# Patient Record
Sex: MALE | Race: AMERICAN INDIAN OR ALASKA NATIVE
[De-identification: names, ages, dates, MRNs, and addresses within clinical notes are randomized per-mention and may not be internally consistent; named-entity substitution may affect disease eponyms.]

---

## 2018-09-14 ENCOUNTER — HOSPITAL ENCOUNTER (EMERGENCY)
Dept: HOSPITAL 5 - ED | Age: 38
Discharge: HOME | End: 2018-09-14
Payer: COMMERCIAL

## 2018-09-14 VITALS — SYSTOLIC BLOOD PRESSURE: 160 MMHG | DIASTOLIC BLOOD PRESSURE: 102 MMHG

## 2018-09-14 DIAGNOSIS — F17.200: ICD-10-CM

## 2018-09-14 DIAGNOSIS — I10: Primary | ICD-10-CM

## 2018-09-14 DIAGNOSIS — R20.2: ICD-10-CM

## 2018-09-14 LAB
BASOPHILS # (AUTO): 0 K/MM3 (ref 0–0.1)
BASOPHILS NFR BLD AUTO: 0.3 % (ref 0–1.8)
BILIRUB UR QL STRIP: (no result)
BLOOD UR QL VISUAL: (no result)
BUN SERPL-MCNC: 8 MG/DL (ref 9–20)
BUN/CREAT SERPL: 7 %
CALCIUM SERPL-MCNC: 9.8 MG/DL (ref 8.4–10.2)
EOSINOPHIL # BLD AUTO: 0.3 K/MM3 (ref 0–0.4)
EOSINOPHIL NFR BLD AUTO: 5 % (ref 0–4.3)
HCT VFR BLD CALC: 47.3 % (ref 35.5–45.6)
HEMOLYSIS INDEX: 11
HGB BLD-MCNC: 16.2 GM/DL (ref 11.8–15.2)
LYMPHOCYTES # BLD AUTO: 1.4 K/MM3 (ref 1.2–5.4)
LYMPHOCYTES NFR BLD AUTO: 20.6 % (ref 13.4–35)
MCH RBC QN AUTO: 31 PG (ref 28–32)
MCHC RBC AUTO-ENTMCNC: 34 % (ref 32–34)
MCV RBC AUTO: 90 FL (ref 84–94)
MONOCYTES # (AUTO): 0.7 K/MM3 (ref 0–0.8)
MONOCYTES % (AUTO): 10.3 % (ref 0–7.3)
PH UR STRIP: 7 [PH] (ref 5–7)
PLATELET # BLD: 168 K/MM3 (ref 140–440)
PROT UR STRIP-MCNC: (no result) MG/DL
RBC # BLD AUTO: 5.26 M/MM3 (ref 3.65–5.03)
RBC #/AREA URNS HPF: 3 /HPF (ref 0–6)
UROBILINOGEN UR-MCNC: < 2 MG/DL (ref ?–2)
WBC #/AREA URNS HPF: < 1 /HPF (ref 0–6)

## 2018-09-14 PROCEDURE — 70450 CT HEAD/BRAIN W/O DYE: CPT

## 2018-09-14 PROCEDURE — 93010 ELECTROCARDIOGRAM REPORT: CPT

## 2018-09-14 PROCEDURE — 85025 COMPLETE CBC W/AUTO DIFF WBC: CPT

## 2018-09-14 PROCEDURE — 93005 ELECTROCARDIOGRAM TRACING: CPT

## 2018-09-14 PROCEDURE — 99284 EMERGENCY DEPT VISIT MOD MDM: CPT

## 2018-09-14 PROCEDURE — 81001 URINALYSIS AUTO W/SCOPE: CPT

## 2018-09-14 PROCEDURE — 36415 COLL VENOUS BLD VENIPUNCTURE: CPT

## 2018-09-14 PROCEDURE — 80048 BASIC METABOLIC PNL TOTAL CA: CPT

## 2018-09-14 NOTE — CAT SCAN REPORT
FINAL REPORT



EXAM:  CT HEAD/BRAIN WO CON



HISTORY:  headache, numb l foot 



TECHNIQUE:  Routine axial imaging was obtained of the brain

without IV contrast.



FINDINGS:  

The ventricular system is appropriate in size and is symmetric.

There is no evidence of acute stroke or hemorrhage. The

visualized sinuses are clear. The mastoid air cells are well

pneumatized. The calvarium appears intact.



IMPRESSION:  

No acute intracranial process.

## 2018-09-14 NOTE — EMERGENCY DEPARTMENT REPORT
ED General Adult HPI





- General


Chief complaint: Dizziness


Stated complaint: DIZZINESS,HEADACHE,NUMB FOOT


Time Seen by Provider: 09/14/18 06:12


Source: patient


Mode of arrival: Ambulatory


Limitations: No Limitations





- History of Present Illness


Initial comments: 


This is a 37-year-old male who states he's had intermittent numbness of his 

foot and sometimes of his left face for the last 1 month.  He states he was 

seen at [place] [facility] urgent care 2 weeks ago for the same symptoms.  

Yesterday he had a "slight headache" which has resolved.  He decided To the 

emergency room to be evaluated.  He does not complain of any persistent 

symptoms.  His face is not numb.  His foot is not numb.  He states he felt 

slightly dizzy at work.  He is not taking any current medications.  He denies a 

history of diabetes.  


-: month(s)


Location: face, left, lower extremity


Radiation: non-radiation


Severity scale (0 -10): 0


Quality: other (tingling)


Consistency: intermittent, now resolved


Improves with: none


Worsens with: none


Associated Symptoms: denies other symptoms, headaches (as above)


Treatments Prior to Arrival: none





- Related Data


 Previous Rx's











 Medication  Instructions  Recorded  Last Taken  Type


 


Amlodipine Besylate [Norvasc] 2.5 mg PO QDAY #30 tablet 09/14/18 Unknown Rx











 Allergies











Allergy/AdvReac Type Severity Reaction Status Date / Time


 


No Known Allergies Allergy   Unverified 09/14/18 01:19














ED Review of Systems


ROS: 


Stated complaint: DIZZINESS,HEADACHE,NUMB FOOT


Other details as noted in HPI





Constitutional: denies: chills, fever


Eyes: denies: eye pain, eye discharge, vision change


ENT: denies: ear pain, throat pain


Respiratory: denies: cough, shortness of breath, wheezing


Cardiovascular: denies: chest pain, palpitations


Endocrine: no symptoms reported


Gastrointestinal: denies: abdominal pain, nausea, diarrhea


Genitourinary: denies: urgency, dysuria


Musculoskeletal: denies: back pain, joint swelling, arthralgia


Skin: denies: rash, lesions


Neurological: as per HPI, headache, paresthesias.  denies: weakness, numbness, 

confusion, abnormal gait, vertigo


Psychiatric: denies: anxiety, depression


Hematological/Lymphatic: denies: easy bleeding, easy bruising





ED Past Medical Hx





- Past Medical History


Previous Medical History?: No





- Surgical History


Past Surgical History?: No





- Social History


Smoking Status: Current Every Day Smoker


Substance Use Type: None





- Medications


Home Medications: 


 Home Medications











 Medication  Instructions  Recorded  Confirmed  Last Taken  Type


 


Amlodipine Besylate [Norvasc] 2.5 mg PO QDAY #30 tablet 09/14/18  Unknown Rx














ED Physical Exam





- General


Limitations: No Limitations


General appearance: alert, in no apparent distress





- Head


Head exam: Present: atraumatic, normocephalic





- Eye


Eye exam: Present: normal appearance, PERRL, EOMI.  Absent: scleral icterus





- ENT


ENT exam: Present: mucous membranes moist





- Neck


Neck exam: Present: normal inspection.  Absent: tenderness, meningismus





- Respiratory


Respiratory exam: Present: normal lung sounds bilaterally.  Absent: respiratory 

distress





- Cardiovascular


Cardiovascular Exam: Present: regular rate, normal rhythm.  Absent: systolic 

murmur, diastolic murmur, rubs, gallop





- GI/Abdominal


GI/Abdominal exam: Present: soft, normal bowel sounds.  Absent: distended, 

tenderness, guarding, rebound, rigid





- Rectal


Rectal exam: Present: deferred





- Extremities Exam


Extremities exam: Present: normal inspection





- Back Exam


Back exam: Present: normal inspection.  Absent: CVA tenderness (R), CVA 

tenderness (L), muscle spasm, paraspinal tenderness, vertebral tenderness





- Neurological Exam


Neurological exam: Present: alert, oriented X3, CN II-XII intact, normal gait, 

other (cerebellar testing was normal).  Absent: motor sensory deficit





- Psychiatric


Psychiatric exam: Present: normal affect, normal mood





- Skin


Skin exam: Present: warm, dry, intact, normal color.  Absent: rash





ED Course


 Vital Signs











  09/14/18 09/14/18 09/14/18





  01:23 04:27 05:41


 


Temperature 97.8 F 97.7 F 


 


Pulse Rate 71 57 L 74


 


Respiratory 14 14 18





Rate   


 


Blood Pressure 149/101 156/98 


 


Blood Pressure   160/102





[Right]   


 


O2 Sat by Pulse 98 98 98





Oximetry   














- Reevaluation(s)


Reevaluation #1: 


Patient was found resting comfortably and totally asymptomatic.  He has no 

complaints at the time my encounter.


09/14/18 07:07








ED Medical Decision Making





- Lab Data


Result diagrams: 


 09/14/18 01:41





 09/14/18 01:41








 Laboratory Results - last 24 hr











  09/14/18 09/14/18





  01:41 01:41


 


WBC  6.7 


 


RBC  5.26 H 


 


Hgb  16.2 H 


 


Hct  47.3 H 


 


MCV  90 


 


MCH  31 


 


MCHC  34 


 


RDW  12.2 L 


 


Plt Count  168 


 


Lymph % (Auto)  20.6 


 


Mono % (Auto)  10.3 H 


 


Eos % (Auto)  5.0 H 


 


Baso % (Auto)  0.3 


 


Lymph #  1.4 


 


Mono #  0.7 


 


Eos #  0.3 


 


Baso #  0.0 


 


Seg Neutrophils %  63.8 


 


Seg Neutrophils #  4.3 


 


Sodium   139


 


Potassium   4.4


 


Chloride   95.7 L


 


Carbon Dioxide   31 H


 


Anion Gap   17


 


BUN   8 L


 


Creatinine   1.2


 


Estimated GFR   > 60


 


BUN/Creatinine Ratio   7


 


Glucose   86


 


Calcium   9.8














- EKG Data


EKG shows normal: sinus rhythm, axis (leftward), intervals, QRS complexes, ST-T 

waves


Rate: normal





- EKG Data


Interpretation: no acute changes





- Medical Decision Making


Patient is found to have 3 elevated blood pressure readings.  He be placed on 

Norvasc.  He will be instructed to follow-up with a primary care physician.  I 

do not think the hypertension the paresthesias are related.





Critical care attestation.: 


If time is entered above; I have spent that time in minutes in the direct care 

of this critically ill patient, excluding procedure time.








ED Disposition


Clinical Impression: 


 Essential hypertension, Paresthesia





Disposition: DC-01 TO HOME OR SELFCARE


Is pt being admited?: No


Does the pt Need Aspirin: No


Condition: Stable


Instructions:  Hypertension (ED), Paresthesia (ED)


Additional Instructions: 


Follow-up with her primary care provider.  Return any acute change or worsening 

symptoms.  It appears to do have high blood pressure.  I have gone U on some 

medicine for this.


Prescriptions: 


Amlodipine Besylate [Norvasc] 2.5 mg PO QDAY #30 tablet


Referrals: 


PRIMARY CARE,MD [Primary Care Provider] - 2-3 Days


Time of Disposition: 07:10

## 2018-09-18 ENCOUNTER — HOSPITAL ENCOUNTER (EMERGENCY)
Dept: HOSPITAL 5 - ED | Age: 38
LOS: 1 days | Discharge: HOME | End: 2018-09-19
Payer: COMMERCIAL

## 2018-09-18 DIAGNOSIS — R51: ICD-10-CM

## 2018-09-18 DIAGNOSIS — I10: ICD-10-CM

## 2018-09-18 DIAGNOSIS — F17.200: ICD-10-CM

## 2018-09-18 DIAGNOSIS — F41.9: Primary | ICD-10-CM

## 2018-09-18 LAB
HCT VFR BLD CALC: 44.7 % (ref 35.5–45.6)
HGB BLD-MCNC: 16.1 GM/DL (ref 11.8–15.2)
MCH RBC QN AUTO: 32 PG (ref 28–32)
MCHC RBC AUTO-ENTMCNC: 36 % (ref 32–34)
MCV RBC AUTO: 89 FL (ref 84–94)
PLATELET # BLD: 163 K/MM3 (ref 140–440)
RBC # BLD AUTO: 5.05 M/MM3 (ref 3.65–5.03)

## 2018-09-18 PROCEDURE — 93010 ELECTROCARDIOGRAM REPORT: CPT

## 2018-09-18 PROCEDURE — 36415 COLL VENOUS BLD VENIPUNCTURE: CPT

## 2018-09-18 PROCEDURE — 80048 BASIC METABOLIC PNL TOTAL CA: CPT

## 2018-09-18 PROCEDURE — 93005 ELECTROCARDIOGRAM TRACING: CPT

## 2018-09-18 PROCEDURE — 71046 X-RAY EXAM CHEST 2 VIEWS: CPT

## 2018-09-18 PROCEDURE — 84484 ASSAY OF TROPONIN QUANT: CPT

## 2018-09-18 PROCEDURE — 99284 EMERGENCY DEPT VISIT MOD MDM: CPT

## 2018-09-18 PROCEDURE — 85025 COMPLETE CBC W/AUTO DIFF WBC: CPT

## 2018-09-18 PROCEDURE — 85007 BL SMEAR W/DIFF WBC COUNT: CPT

## 2018-09-18 NOTE — EMERGENCY DEPARTMENT REPORT
HPI





- General


Chief Complaint: Anxiety


Time Seen by Provider: 09/18/18 23:12





- HPI


HPI: 


37-year-old  male presents to the emergency department via EMS 

from home with the complaint of some left-sided chest pain and a headache that 

started just prior to presentation while he was having an argument with his 

daughter.  The chest pain resolved upon presentation.  The headache is a mild 

intermittent left-sided throbbing headache that is 3 out of 10 in intensity at 

its worst.  He denies any vision change, slurred speech, back pain, shortness 

of breath, fever, nausea, vomiting or any neurological deficits.  He has a past 

medical history of hypertension for which he takes 2.5 mg of amlodipine daily.  

He is a tobacco smoker but denies any illicit drug use or heavy alcohol abuse.  

No recent travel, sick contacts at home, recent surgeries or immobility.  He 

did not take anything for his symptoms prior presentation.  He does not have a 

primary care physician.








ED Past Medical Hx





- Past Medical History


Hx Hypertension: Yes





- Surgical History


Past Surgical History?: No





- Social History


Smoking Status: Current Every Day Smoker


Substance Use Type: None





- Medications


Home Medications: 


 Home Medications











 Medication  Instructions  Recorded  Confirmed  Last Taken  Type


 


Amlodipine Besylate [Norvasc] 2.5 mg PO QDAY #30 tablet 09/14/18  Unknown Rx














ED Review of Systems


ROS: 


Stated complaint: HA/CP


Other details as noted in HPI





Comment: All other systems reviewed and negative


Constitutional: denies: chills, fever


Eyes: denies: eye pain, eye discharge, vision change


ENT: denies: ear pain, throat pain


Respiratory: denies: cough, shortness of breath, wheezing


Cardiovascular: chest pain (resolved).  denies: edema


Gastrointestinal: denies: abdominal pain, nausea, diarrhea


Genitourinary: denies: urgency, dysuria


Musculoskeletal: denies: back pain, joint swelling, arthralgia


Skin: denies: rash, lesions


Neurological: headache.  denies: weakness, numbness





Physical Exam





- Physical Exam


Vital Signs: 


 Vital Signs











  09/18/18 09/18/18





  23:12 23:15


 


Temperature 98.8 F 98.7 F


 


Pulse Rate 80 88


 


Respiratory 16 





Rate  


 


Blood Pressure  159/91


 


Blood Pressure 159/90 





[Left]  


 


O2 Sat by Pulse  100





Oximetry  











Physical Exam: 





GENERAL: The patient is well-developed well-nourished.


HENT: Normocephalic.  Atraumatic.    Patient has moist mucous membranes.


EYES: Extraocular motions are intact.  Pupils equal reactive to light 

bilaterally.


NECK: Supple.  Trachea is midline.


CHEST/LUNGS: Clear to auscultation.  There is no respiratory distress noted.


HEART/CARDIOVASCULAR: Regular.  There is no tachycardia.  There is no murmur.


ABDOMEN: Abdomen is soft, nontender.  Patient has normal bowel sounds.  There 

is no abdominal distention.


SKIN: Skin is warm and dry.


NEURO: The patient is awake, alert, and oriented.  The patient is cooperative.  

The patient has no focal neurologic deficits.  The patient has normal speech.


MUSCULOSKELETAL: There is no tenderness or deformity.  There is no limitation 

range of motion.  There is no evidence of acute injury.





ED Course


 Vital Signs











  09/18/18 09/18/18





  23:12 23:15


 


Temperature 98.8 F 98.7 F


 


Pulse Rate 80 88


 


Respiratory 16 





Rate  


 


Blood Pressure  159/91


 


Blood Pressure 159/90 





[Left]  


 


O2 Sat by Pulse  100





Oximetry  














ED Medical Decision Making





- Lab Data


Result diagrams: 


 09/18/18 23:35





 09/18/18 23:35





- EKG Data


-: EKG Interpreted by Me


EKG shows normal: sinus rhythm, axis, intervals, QRS complexes, ST-T waves


Rate: normal





- EKG Data


When compared to previous EKG there are: previous EKG unavailable


Interpretation: normal EKG





- Radiology Data


Radiology results: image reviewed


interpreted by me: 





Chest x-ray does not show any acute process.  There are no pleural effusions, 

obvious pneumonia and there is no pneumothorax.





- Medical Decision Making





The patient presented with complaint of some mild chest pain and headache after 

having an argument with his daughter.  The symptoms seem to come and go but 

every time the patient is rechecked he appears to be sleeping and resting 

comfortably.  EKG did not show any signs of ST elevation MI, ischemia or 

dysrhythmia.  Chest x-ray does not show any acute process.  He is low on the 

Heart score criteria and has a low LEONIE score.  Negative troponins 2.  Vital 

signs stable throughout his ED course.  He was given some Tylenol for his 

symptoms with resolution at that time.  He is given a referral for primary care 

and cardiology.  He will return to the ER with any worsening of his symptoms or 

any acute distress.


Critical Care Time: No


Critical care attestation.: 


If time is entered above; I have spent that time in minutes in the direct care 

of this critically ill patient, excluding procedure time.








ED Disposition


Clinical Impression: 


 Anxiety, Intermittent chest pain





Headache


Qualifiers:


 Headache type: unspecified Headache chronicity pattern: unspecified pattern 

Intractability: not intractable Qualified Code(s): R51 - Headache





Hypertension


Qualifiers:


 Hypertension type: essential hypertension Qualified Code(s): I10 - Essential (

primary) hypertension





Disposition: DC-01 TO HOME OR SELFCARE


Is pt being admited?: No


Condition: Stable


Instructions:  Chest Pain (ED), Hypertension (ED), Anxiety (ED)


Additional Instructions: 


Please try and quit smoking.  Continue with your blood pressure medications.  

Try and stay away from foods that are high in salt and caffeinated products to 

help with your blood pressure.  Keep a blood pressure log.  I have given you a 

referral for a local cardiologist, Dr. Vazquez, to follow up regarding your 

previous chest pains.  Return to the emergency department with any return or 

worsening of your symptoms, or with any acute distress.


Referrals: 


[facility] [Outside] - 3-5 Days


URI VAZQUEZ MD [Staff Physician] - 3-5 Days


PRIMARY CARE,MD [Primary Care Provider] - 3-5 Days





Heart Score





- HEART Score


History: Slightly suspicious


EKG: Normal


Age: < 45


Risk factors: 1-2 risk factors


Troponin: < normal limit


HEART Score: 1





LEONIE score





- Leonie Score


Age > 65: (0) No


Aspirin use within the Past 7 Days: (0) No


3 or more CAD Risk Factors: (0) No


2 or more Angina events in past 24 hrs: (1) Yes (If pain is angina)


Known CAD with more than 50% Stenosis: (0) No


Elevated Cardiac Markers: (0) No


ST Deviation Greater than 0.5mm: (0) No


LEONIE Score: 1

## 2018-09-19 VITALS — DIASTOLIC BLOOD PRESSURE: 77 MMHG | SYSTOLIC BLOOD PRESSURE: 100 MMHG

## 2018-09-19 LAB
BAND NEUTROPHILS # (MANUAL): 0 K/MM3
BUN SERPL-MCNC: 11 MG/DL (ref 9–20)
BUN/CREAT SERPL: 10 %
CALCIUM SERPL-MCNC: 9.8 MG/DL (ref 8.4–10.2)
HEMOLYSIS INDEX: 10
MYELOCYTES # (MANUAL): 0 K/MM3
PROMYELOCYTES # (MANUAL): 0 K/MM3
TOTAL CELLS COUNTED BLD: 100

## 2018-09-19 NOTE — XRAY REPORT
FINAL REPORT



EXAM:  XR CHEST ROUTINE 2V



HISTORY:  Chest pain 



TECHNIQUE:  PA and lateral views of the chest were obtained.



PRIORS:  None.



FINDINGS:  

Ill-defined opacities are noted in the right midlung which mildly

obscures the hilum. There is no pleural effusion. The left lung

is clear. The cardiac silhouette is not enlarged. There is mild

broad central peribronchial thickening. 



The mid/lower thoracic spine there is suggested mild sharply

delineated endplate depressions. 



IMPRESSION:  

Ill-defined right midlung opacities suggestive of infiltrate.

Remaining lungs are clear and there is no pleural effusion. 



Incidental well demarcated suggested endplate compressions in

lower thoracic spine. Findings may be projectional, however

lysosomal storage disease or sickle cell anemia can result in a

similar appearance. Correlation with clinical history requested.

## 2019-01-08 ENCOUNTER — HOSPITAL ENCOUNTER (EMERGENCY)
Dept: HOSPITAL 5 - ED | Age: 39
LOS: 1 days | Discharge: HOME | End: 2019-01-09
Payer: COMMERCIAL

## 2019-01-08 VITALS — DIASTOLIC BLOOD PRESSURE: 90 MMHG | SYSTOLIC BLOOD PRESSURE: 144 MMHG

## 2019-01-08 DIAGNOSIS — I10: ICD-10-CM

## 2019-01-08 DIAGNOSIS — R20.2: Primary | ICD-10-CM

## 2019-01-08 DIAGNOSIS — F17.200: ICD-10-CM

## 2019-01-08 DIAGNOSIS — Z79.82: ICD-10-CM

## 2019-01-08 PROCEDURE — 99283 EMERGENCY DEPT VISIT LOW MDM: CPT

## 2019-01-08 PROCEDURE — 82962 GLUCOSE BLOOD TEST: CPT

## 2019-01-09 NOTE — EMERGENCY DEPARTMENT REPORT
ED General Adult HPI





- General


Chief complaint: Medical Clearance


Stated complaint: FEELING ILL


Time Seen by Provider: 01/09/19 02:21


Source: patient


Mode of arrival: Ambulatory


Limitations: No Limitations





- History of Present Illness


Initial comments: 





38-year-old -American male to emergency Department complaining of having 

a several month history of having tingling sensations in hot flashes to his body

since starting a new blood pressure medication.  States that he follow-up with 

his cardiologist, who adjusted his medications, which helped to improve his 

symptomatology.  However, after he restarted taking the previous medications 

symptoms begin to reemerge.  He reports no presyncope, no palpitations, no 

hemoptysis, no hematemesis.  No headache, no nausea, no vomiting, no angioedema,

no wheezing, no rashes, no insomnia


Consistency: constant


Improves with: none


Worsens with: none


Associated Symptoms: denies: confusion, chest pain, diaphoresis, loss of 

appetite, malaise, nausea/vomiting, syncope, weakness





- Related Data


                                  Previous Rx's











 Medication  Instructions  Recorded  Last Taken  Type


 


Amlodipine Besylate [Norvasc] 2.5 mg PO QDAY #30 tablet 09/14/18 Unknown Rx











                                    Allergies











Allergy/AdvReac Type Severity Reaction Status Date / Time


 


aspirin Allergy  Swelling Verified 10/30/18 06:56














ED Review of Systems


ROS: 


Stated complaint: FEELING ILL


Other details as noted in HPI





Constitutional: denies: chills, fever


Eyes: denies: eye pain, eye discharge, vision change


ENT: denies: ear pain, throat pain


Respiratory: denies: cough, shortness of breath, wheezing


Cardiovascular: denies: chest pain, palpitations


Endocrine: no symptoms reported


Gastrointestinal: denies: abdominal pain, nausea, diarrhea


Genitourinary: denies: urgency, dysuria


Musculoskeletal: denies: back pain, joint swelling, arthralgia


Skin: denies: rash, lesions


Neurological: denies: headache, weakness, paresthesias


Psychiatric: denies: anxiety, depression


Hematological/Lymphatic: denies: easy bleeding, easy bruising





ED Past Medical Hx





- Past Medical History


Hx Hypertension: Yes





- Social History


Smoking Status: Current Every Day Smoker


Substance Use Type: None





- Medications


Home Medications: 


                                Home Medications











 Medication  Instructions  Recorded  Confirmed  Last Taken  Type


 


Amlodipine Besylate [Norvasc] 2.5 mg PO QDAY #30 tablet 09/14/18  Unknown Rx














ED Physical Exam





- General


Limitations: No Limitations


General appearance: alert, in no apparent distress





- Head


Head exam: Present: atraumatic, normocephalic





- Eye


Eye exam: Present: normal appearance, PERRL, EOMI


Pupils: Present: normal accommodation





- ENT


ENT exam: Present: mucous membranes moist.  Absent: normal orophraynx





- Neck


Neck exam: Present: normal inspection





- Respiratory


Respiratory exam: Present: normal lung sounds bilaterally.  Absent: respiratory 

distress, rales, rhonchi





- Cardiovascular


Cardiovascular Exam: Present: regular rate, normal rhythm.  Absent: bradycardia,

 tachycardia, systolic murmur, diastolic murmur, rubs, gallop





- GI/Abdominal


GI/Abdominal exam: Present: soft, normal bowel sounds





- Rectal


Rectal exam: Present: deferred





- Extremities Exam


Extremities exam: Present: normal inspection





- Back Exam


Back exam: Present: normal inspection, full ROM.  Absent: muscle spasm, 

paraspinal tenderness, vertebral tenderness





- Neurological Exam


Neurological exam: Present: alert, oriented X3, CN II-XII intact, normal gait





- Psychiatric


Psychiatric exam: Present: normal affect, normal mood





- Skin


Skin exam: Present: warm, dry, intact, normal color.  Absent: rash





ED Course





                                   Vital Signs











  01/08/19





  22:46


 


Temperature 98.1 F


 


Pulse Rate 77


 


Respiratory 18





Rate 


 


Blood Pressure 144/90


 


O2 Sat by Pulse 100





Oximetry 














ED Medical Decision Making





- Medical Decision Making





Blood sugar 77.  Discussed with patient need follow with his cardiologist and 

primary care provider for any adjustments to his medication as his vital signs 

are stable was no signs of any allergic reaction or or anaphylaxis.


Critical care attestation.: 


If time is entered above; I have spent that time in minutes in the direct care 

of this critically ill patient, excluding procedure time.








ED Disposition


Clinical Impression: 


 Medication reaction





Disposition: DC-01 TO HOME OR SELFCARE


Is pt being admited?: No


Does the pt Need Aspirin: No


Condition: Stable


Instructions:  Adverse Drug Reaction (ED)


Referrals: 


PRIMARY CARE,MD [Primary Care Provider] - 3-5 Days